# Patient Record
Sex: MALE | URBAN - METROPOLITAN AREA
[De-identification: names, ages, dates, MRNs, and addresses within clinical notes are randomized per-mention and may not be internally consistent; named-entity substitution may affect disease eponyms.]

---

## 2019-09-26 ENCOUNTER — APPOINTMENT (OUTPATIENT)
Dept: URBAN - METROPOLITAN AREA CLINIC 293 | Age: 44
Setting detail: DERMATOLOGY
End: 2019-09-26

## 2019-09-26 DIAGNOSIS — R20.8 OTHER DISTURBANCES OF SKIN SENSATION: ICD-10-CM

## 2019-09-26 DIAGNOSIS — B35.3 TINEA PEDIS: ICD-10-CM

## 2019-09-26 DIAGNOSIS — B35.1 TINEA UNGUIUM: ICD-10-CM

## 2019-09-26 DIAGNOSIS — L29.8 OTHER PRURITUS: ICD-10-CM

## 2019-09-26 PROBLEM — L08.9 LOCAL INFECTION OF THE SKIN AND SUBCUTANEOUS TISSUE, UNSPECIFIED: Status: ACTIVE | Noted: 2019-09-26

## 2019-09-26 PROCEDURE — OTHER ORDER TESTS: OTHER

## 2019-09-26 PROCEDURE — 99203 OFFICE O/P NEW LOW 30 MIN: CPT | Mod: 25

## 2019-09-26 PROCEDURE — OTHER NAIL CLIPPING FOR PATHOLOGY: OTHER

## 2019-09-26 PROCEDURE — OTHER PRESCRIPTION: OTHER

## 2019-09-26 PROCEDURE — OTHER ADDITIONAL NOTES: OTHER

## 2019-09-26 PROCEDURE — OTHER MIPS QUALITY: OTHER

## 2019-09-26 PROCEDURE — OTHER COUNSELING: OTHER

## 2019-09-26 PROCEDURE — 11104 PUNCH BX SKIN SINGLE LESION: CPT

## 2019-09-26 PROCEDURE — OTHER BIOPSY BY PUNCH METHOD: OTHER

## 2019-09-26 RX ORDER — KETOCONAZOLE 20 MG/G
CREAM TOPICAL
Qty: 1 | Refills: 5 | COMMUNITY
Start: 2019-09-26

## 2019-09-26 RX ORDER — CICLOPIROX 80 MG/ML
SOLUTION TOPICAL QHS
Qty: 1 | Refills: 11 | COMMUNITY
Start: 2019-09-26

## 2019-09-26 ASSESSMENT — LOCATION SIMPLE DESCRIPTION DERM
LOCATION SIMPLE: LEFT GREAT TOE
LOCATION SIMPLE: RIGHT ANKLE
LOCATION SIMPLE: LEFT FOOT
LOCATION SIMPLE: RIGHT FOOT

## 2019-09-26 ASSESSMENT — LOCATION ZONE DERM
LOCATION ZONE: FEET
LOCATION ZONE: TOENAIL
LOCATION ZONE: LEG

## 2019-09-26 ASSESSMENT — LOCATION DETAILED DESCRIPTION DERM
LOCATION DETAILED: RIGHT ANKLE
LOCATION DETAILED: RIGHT DORSAL FOOT
LOCATION DETAILED: LEFT GREAT TOENAIL
LOCATION DETAILED: LEFT DORSAL FOOT

## 2019-09-26 NOTE — PROCEDURE: NAIL CLIPPING FOR PATHOLOGY
Billing Type: Third-Party Bill
Add [id number] To Bill?: No
Detail Level: Detailed
Body Location Override (Optional - Billing Will Still Be Based On Selected Body Map Location If Applicable): L great toe

## 2019-09-26 NOTE — PROCEDURE: BIOPSY BY PUNCH METHOD
Dressing: bandage
Billing Type: Third-Party Bill
Suture Removal: 14 days
Size Of Lesion In Cm (Optional): 0
Wound Care: Bacitracin
Post-Care Instructions: I reviewed with the patient in detail post-care instructions. Patient is to keep the biopsy site dry overnight, and then apply bacitracin twice daily until healed. Patient may apply hydrogen peroxide soaks to remove any crusting.
Anesthesia Type: 2% lidocaine with epinephrine
Punch Size In Mm: 3
Bill [id number] For Specimen Handling/Conveyance To Laboratory?: no
Anesthesia Volume In Cc (Will Not Render If 0): 0.4
Biopsy Type: H and E
Home Suture Removal Text: Patient was provided a home suture removal kit and will remove their sutures at home.  If they have any questions or difficulties they will call the office.
Hemostasis: None
Notification Instructions: Patient will be notified of biopsy results. However, patient instructed to call the office if not contacted within 2 weeks.
Detail Level: Detailed
Epidermal Sutures: 4-0 Nylon
Was A Bandage Applied: Yes
Consent: Written consent was obtained and risks were reviewed including but not limited to scarring, infection, bleeding, scabbing, incomplete removal, nerve damage and allergy to anesthesia.